# Patient Record
Sex: FEMALE | Race: OTHER
[De-identification: names, ages, dates, MRNs, and addresses within clinical notes are randomized per-mention and may not be internally consistent; named-entity substitution may affect disease eponyms.]

---

## 2018-01-03 ENCOUNTER — HOSPITAL ENCOUNTER (EMERGENCY)
Dept: HOSPITAL 8 - ED | Age: 43
LOS: 1 days | Discharge: HOME | End: 2018-01-04
Payer: COMMERCIAL

## 2018-01-03 VITALS — HEIGHT: 65 IN | WEIGHT: 195.99 LBS | BODY MASS INDEX: 32.65 KG/M2

## 2018-01-03 VITALS — SYSTOLIC BLOOD PRESSURE: 154 MMHG | DIASTOLIC BLOOD PRESSURE: 98 MMHG

## 2018-01-03 DIAGNOSIS — S83.412A: ICD-10-CM
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
## 2018-01-03 DIAGNOSIS — I10: ICD-10-CM

## 2018-01-03 DIAGNOSIS — S83.422A: Primary | ICD-10-CM
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
## 2018-01-03 PROCEDURE — 29505 APPLICATION LONG LEG SPLINT: CPT

## 2018-01-03 PROCEDURE — 99284 EMERGENCY DEPT VISIT MOD MDM: CPT

## 2018-05-09 ENCOUNTER — HOSPITAL ENCOUNTER (EMERGENCY)
Dept: HOSPITAL 8 - ED | Age: 43
Discharge: HOME | End: 2018-05-09
Payer: MEDICAID

## 2018-05-09 VITALS — DIASTOLIC BLOOD PRESSURE: 85 MMHG | SYSTOLIC BLOOD PRESSURE: 141 MMHG

## 2018-05-09 VITALS — HEIGHT: 65 IN | WEIGHT: 272.27 LBS | BODY MASS INDEX: 45.36 KG/M2

## 2018-05-09 DIAGNOSIS — R60.0: Primary | ICD-10-CM

## 2018-05-09 DIAGNOSIS — I10: ICD-10-CM

## 2018-05-09 PROCEDURE — 93970 EXTREMITY STUDY: CPT

## 2018-05-09 PROCEDURE — 99284 EMERGENCY DEPT VISIT MOD MDM: CPT

## 2025-04-08 ENCOUNTER — OFFICE VISIT (OUTPATIENT)
Dept: URGENT CARE | Facility: CLINIC | Age: 50
End: 2025-04-08
Payer: COMMERCIAL

## 2025-04-08 VITALS
DIASTOLIC BLOOD PRESSURE: 72 MMHG | HEART RATE: 91 BPM | OXYGEN SATURATION: 97 % | BODY MASS INDEX: 44.15 KG/M2 | HEIGHT: 65 IN | TEMPERATURE: 97.5 F | WEIGHT: 265 LBS | SYSTOLIC BLOOD PRESSURE: 136 MMHG | RESPIRATION RATE: 20 BRPM

## 2025-04-08 DIAGNOSIS — K08.89 PAIN, DENTAL: ICD-10-CM

## 2025-04-08 PROCEDURE — 99203 OFFICE O/P NEW LOW 30 MIN: CPT | Performed by: PHYSICIAN ASSISTANT

## 2025-04-08 RX ORDER — AMOXICILLIN 875 MG/1
875 TABLET, COATED ORAL 2 TIMES DAILY
Qty: 20 TABLET | Refills: 0 | Status: SHIPPED | OUTPATIENT
Start: 2025-04-08 | End: 2025-04-18

## 2025-04-08 ASSESSMENT — FIBROSIS 4 INDEX: FIB4 SCORE: 0.72

## 2025-04-09 ASSESSMENT — ENCOUNTER SYMPTOMS
MYALGIAS: 0
VOMITING: 0
CHILLS: 0
HEADACHES: 0
NAUSEA: 0
SINUS PRESSURE: 0
FEVER: 0

## 2025-04-09 NOTE — PROGRESS NOTES
"Jelena Mathews is a 49 y.o. female who presents with Dental Pain (Since yesterday abscess)            Dental Pain   This is a new problem. The current episode started yesterday (left lower back molar). The problem occurs constantly. The problem has been gradually worsening. The pain is moderate. Associated symptoms include facial pain. Pertinent negatives include no difficulty swallowing, fever, oral bleeding, sinus pressure or thermal sensitivity. She has tried nothing for the symptoms.       History reviewed. No pertinent past medical history.    History reviewed. No pertinent surgical history.    History reviewed. No pertinent family history.    Patient has no known allergies.    Medications, Allergies, and current problem list reviewed today in Epic      Review of Systems   Constitutional:  Negative for chills, fever and malaise/fatigue.   HENT:  Negative for sinus pressure.         Left lower dental pain   Gastrointestinal:  Negative for nausea and vomiting.   Musculoskeletal:  Negative for myalgias.   Neurological:  Negative for headaches.        All other systems reviewed and are negative.         Objective     /72   Pulse 91   Temp 36.4 °C (97.5 °F) (Temporal)   Resp 20   Ht 1.651 m (5' 5\")   Wt 120 kg (265 lb)   SpO2 97%   BMI 44.10 kg/m²      Physical Exam  Constitutional:       General: She is not in acute distress.     Appearance: She is not ill-appearing.   HENT:      Head: Normocephalic and atraumatic.      Mouth/Throat:      Mouth: Mucous membranes are moist.     Cardiovascular:      Rate and Rhythm: Normal rate and regular rhythm.   Pulmonary:      Effort: Pulmonary effort is normal. No respiratory distress.      Breath sounds: No stridor. No wheezing.   Skin:     General: Skin is warm and dry.   Neurological:      General: No focal deficit present.      Mental Status: She is alert and oriented to person, place, and time.   Psychiatric:         Mood and Affect: Mood " normal.         Behavior: Behavior normal.         Thought Content: Thought content normal.         Judgment: Judgment normal.                                  Assessment & Plan  Pain, dental    Orders:    amoxicillin (AMOXIL) 875 MG tablet; Take 1 Tablet by mouth 2 times a day for 10 days.       Differential diagnoses, Supportive care, and indications for immediate follow-up discussed with patient.   Pathogenesis of diagnosis discussed including typical length and natural progression.   Instructed to return to clinic or nearest emergency department for any change in condition, further concerns, or worsening of symptoms.    The patient demonstrated a good understanding and agreed with the treatment plan.    Kerline Salmon P.A.-C.